# Patient Record
Sex: FEMALE | Race: WHITE
[De-identification: names, ages, dates, MRNs, and addresses within clinical notes are randomized per-mention and may not be internally consistent; named-entity substitution may affect disease eponyms.]

---

## 2020-11-11 ENCOUNTER — HOSPITAL ENCOUNTER (EMERGENCY)
Dept: HOSPITAL 88 - FSED | Age: 80
LOS: 1 days | Discharge: OTHER INSTITUTION | End: 2020-11-12
Payer: MEDICARE

## 2020-11-11 VITALS — BODY MASS INDEX: 26.58 KG/M2 | HEIGHT: 63 IN | WEIGHT: 150 LBS

## 2020-11-11 DIAGNOSIS — E86.0: ICD-10-CM
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
## 2020-11-11 DIAGNOSIS — E86.1: ICD-10-CM

## 2020-11-11 DIAGNOSIS — S00.83XA: Primary | ICD-10-CM

## 2020-11-11 DIAGNOSIS — R50.9: ICD-10-CM

## 2020-11-11 DIAGNOSIS — I10: ICD-10-CM

## 2020-11-11 PROCEDURE — 70486 CT MAXILLOFACIAL W/O DYE: CPT

## 2020-11-11 PROCEDURE — 72125 CT NECK SPINE W/O DYE: CPT

## 2020-11-11 PROCEDURE — 71250 CT THORAX DX C-: CPT

## 2020-11-11 PROCEDURE — 82553 CREATINE MB FRACTION: CPT

## 2020-11-11 PROCEDURE — 81003 URINALYSIS AUTO W/O SCOPE: CPT

## 2020-11-11 PROCEDURE — 80048 BASIC METABOLIC PNL TOTAL CA: CPT

## 2020-11-11 PROCEDURE — 84484 ASSAY OF TROPONIN QUANT: CPT

## 2020-11-11 PROCEDURE — 87040 BLOOD CULTURE FOR BACTERIA: CPT

## 2020-11-11 PROCEDURE — 74176 CT ABD & PELVIS W/O CONTRAST: CPT

## 2020-11-11 PROCEDURE — 87086 URINE CULTURE/COLONY COUNT: CPT

## 2020-11-11 PROCEDURE — 70450 CT HEAD/BRAIN W/O DYE: CPT

## 2020-11-11 PROCEDURE — 99284 EMERGENCY DEPT VISIT MOD MDM: CPT

## 2020-11-11 PROCEDURE — 85025 COMPLETE CBC W/AUTO DIFF WBC: CPT

## 2020-11-11 PROCEDURE — 93005 ELECTROCARDIOGRAM TRACING: CPT

## 2020-11-11 PROCEDURE — 80076 HEPATIC FUNCTION PANEL: CPT

## 2020-11-11 PROCEDURE — 87400 INFLUENZA A/B EACH AG IA: CPT

## 2020-11-11 NOTE — DIAGNOSTIC IMAGING REPORT
CT C-SPINE W/O - [facility]



HISTORY: Fall



COMPARISON:  None.



TECHNIQUE: CT of the cervical spine without contrast.  Sagittal and coronal

reformations were created.  One or more of the following dose reduction

techniques were used: Automated exposure control, adjustment of the mA and/or

kV according to patient size, and/or utilization of iterative reconstruction

technique.



FINDINGS:  

   

Cervical lordosis is straightened.

There is no significant scoliosis

Mild bone demineralization limits evaluation. 

No definite acute fracture or compression deformity is seen.     

The craniocervical junction is intact. 

No gross spinal canal masses are seen. 

The paravertebral and paraspinal soft tissues are unremarkable. 



Degenerative changes: Mild multilevel spondylosis is present. Multilevel

bilateral facet arthrosis is most prominent on the left at C3-C4. Associated

multilevel spondylolisthesis is present, most prominent at C4-C5 (grade 1).



Incidental findings:

There is scarring in the lung apices. The thyroid gland is atrophic. Minimal

left carotid bulb calcified plaque is present.



IMPRESSION:  

No acute osseous abnormalities. Degenerative changes as described above.



Signed by: Dr. Gonzalez Watson M.D. on 11/11/2020 9:25 PM

## 2020-11-11 NOTE — XMS REPORT
Clinical Summary

                             Created on: 2020



Divya Lcuas

External Reference #: [id number]

: 1940

Sex: Female



Demographics





                          Address                   [address] [place]  [address]

 

                          Home Phone                [phone]

 

                          Preferred Language        Unknown

 

                          Marital Status            

 

                          Shinto Affiliation     Unknown

 

                          Race                      White

 

                          Ethnic Group              Non-





Author





                          Author                    [facility]

 

                          Organization              [facility]

 

                          Address                   Unknown

 

                          Phone                     Unavailable







Support





                Name            Relationship    Address         Phone

 

                Smitha HOGUE            Unknown         [phone]







Care Team Providers





                    Care Team Member Name Role                Phone

 

                    Linden Ramires MD PCP                 [phone]







Allergies





                                        Comments



                 Active Allergy  Reactions       Severity        Noted Date 

 

                                        



hallucination



                           Morphine                  2019 







Medications





                          End Date                  Status



              Medication   Sig          Dispensed    Refills      Start  



                                         Date  

 

                                                    Active



                     losartan (COZAAR) 50 MG  Take 50 mg by       0   



                           tablet                    mouth daily.     

 

                                                    Active



                     levothyroxine sodium  Take by             0   



                           (LEVOTHYROXINE ORAL)      mouth.     

 

                                                    Active



                     HYDROcodone-acetaminophen  Take 1 tablet       0   



                           (NORCO)  mg per     by mouth     



                           tablet                    every 6 (six)     



                                         hours as     



                                         needed for     



                                         moderate     



                                         pain.     







Active Problems





 



                           Problem                   Noted Date

 

 



                           Arthritis of left knee    2019

 

 



                           Arthritis of knee, left   2019







Surgical History





   



                 Surgery         Date            Site/Laterality  Comments

 

   



                           BACK SURGERY              lumbar

 

   



                           CATARACT EXTRACTION       Bilateral 



                                         EXTRACAPSULAR W/   



                                         INTRAOCULAR LENS   



                                         IMPLANTATION   

 

   



                 ARTHROPLASTY, KNEE, TOTAL  2019       Knee/Left       Proc

edure: ARTHROPLASTY, KNEE,

TOTAL;  Surgeon:



                                         Michael Wu MD;  Location: [place] [address] OR;



                                         Service: Orthopedics;  Laterality: Left

;



                                         Medical devices from this surgery are i

n the



                                         Implants section.







Medical History





  



                     Medical History     Date                Comments

 

  



                           Anesthesia                per patient "difficult airw

ay" (no documentation)



                                         provided  / hx of - difficulty swallowi

ng, hx -



                                         hiatal hernia   -   [facility]

 

  



                           Does not exercise         denied SOB or CP    /  unab

le to climb stairs

 

  



                                         Hypothyroidism  

 

  



                                         Hypertension  

 

  



                           Arthritis                 osteoarthritis

 

  



                           Hx of foot surgery        left

 

  



                                         Cataracts, bilateral  







Social History





                                        Date



                 Tobacco Use     Types           Packs/Day       Years Used 

 

                                         



                                         Never Smoker    

 

    



                                         Smokeless Tobacco: Never   



                                         Used   







                    Drinks/Week         oz/Week             Comments



                                         Alcohol Use   

 

                                                             



                                         No   







  



                     Alcohol Habits      Answer              Date Recorded

 

  



                     How often do you have a drink containing alcohol?  Never   

            2019

 

  



                           How many drinks containing alcohol do you have on  No

t asked 



                                         a typical day when you are drinking?  

 

  



                           How often do you have six or more drinks on one  Not 

asked 



                                         occasion?  







 



                           Sex Assigned at Birth     Date Recorded

 

 



                                         Not on file 







Last Filed Vital Signs

Not on file



Plan of Treatment





   



                 Health Maintenance  Due Date        Last Done       Comments

 

   



                           SHINGLES VACCINES (#1)    10/28/1989  

 

   



                           65+ PNEUMOCOCCAL VACCINE  10/28/2004  



                                         (1 of 1 - PPSV23)   

 

   



                           INFLUENZA VACCINE         2020  







Implants





                    Device Identifier   Shelf Expiration Date Model / Serial / L

ot



                 Implanted       Type            Area            Manufactur   



                                         er   

 

                                        2023          [id number] /

 /

                                        [id number]



                 Attune Medial Dome Pat 38mm -  IPM             Left: Knee      

DEPUY   



                     [id number]          IMPLANT             ORTHOPAEDI   



                     Implanted: Qty: 1 on 2019 by  [employer]             HEIDI HERR Robin N., MD at [facility]      

 

                                        2028          [id number] 10 [id number] /

 /

                                        [id number]



                 Femoral Attune Ps Cmt 6 Lt -  IPM             Left: Knee      D

EPUY   



                     [id number]          IMPLANT             ORTHOPAEDI   



                     Implanted: Qty: 1 on 2019 by  [employer]             HEIDI HERR Robin N., MD at [facility]      

 

                                        2027          [id number] /

 /

                                        [id number]



                 Sz 6 Attune Fixed Bearing Tibial  IPM             Left: Knee   

   DEPUY   



                     Plate - [id number]  IMPLANT             ORTHOPAEDI   



                     Implanted: Qty: 1 on 2019 by  [employer]             HEIDI HERR Robin N., MD at [facility]      

 

                                        2023          [id number] 40 [id number] /

 /

[id number]



                 Insert Attune Ps Fb Sz6 6mm -  IPM             Left: Knee      

DEPUY   



                     [id number]          IMPLANT             ORTHOPAEDI   



                     Implanted: Qty: 1 on 2019 by  [employer]             HEIDI HERR Robin N., MD at [facility]      

 

                                        2020          [id number] /

 /

                                        [id number]



                 Cement Bone Hiviscocty 40gr  Surgical        Left: N/A       DE

PUY   



                     Smartset - [id number]  Bone                ORTHO   



                           Implanted: Qty: 1 on 2019 by  Michael Thompson MD at [facility]      

 

                                        2020          [id number] /

 /

                                        [id number]



                 Cement Bone Hiviscocty 40gr  Surgical        Left: Knee      DE

PUY   



                     Smartset - [id number]  Bone                ORTHO   



                           Implanted: Qty: 1 on 2019 by  Michael Thompson MD at [facility]      







Results

Not on fileafter 2019



Insurance





                                        Type



            Payer      Benefit    Subscriber ID  Effective  Phone      Address 



                           Plan /                    Dates   



                                         Group     

 

                                        HMO



                 AETNA MEDICARE  AETNA           [id number]        2019-P   



                           MEDICARE                  resent   



                                         HMO/PPO     



                                         [facility]     







     



            Guarantor Name  Account    Relation to  Date of    Phone      Billin

g Address



                     Type                Patient             Birth  

 

     



            Divya Lucas  Personal/F  Self       1940  [phone]  [address]               (Home)              [place] [address]







Advance Directives





For more information, please contact: [phone]







                          Patient Representative    Explanation



                           Type                      Date Recorded  

 

                                                     



                                         Advance Directives,   



                                         Living Will and   



                                         Medical Power of

## 2020-11-11 NOTE — DIAGNOSTIC IMAGING REPORT
CT BRAIN [facility]



HISTORY: Fall



COMPARISON:  None.



Technique: 

Noncontrast axial scans were obtained from skull base to the vertex.  Coronal

and sagittal reconstructions obtained from the axial data.  One or more of the

following dose reduction techniques were used: Automated exposure control,

adjustment of the mA and/or kV according to patient size, and/or utilization of

iterative reconstruction technique.



DISCUSSION:



Scalp/Skull: Unremarkable.

Brain sulci: Mildly prominent.

Ventricles: Mild to moderate supratentorial ventriculomegaly is slightly out of

proportion to sulcal prominence. The temporal horns are dilated.

Extra-axial spaces: No masses or fluid collections. Carotid siphon

calcifications are present.



Parenchyma: 

Mild to moderate bilateral deep white matter hypodensity is likely chronic

microvascular ischemic change. 

Otherwise, no masses, hemorrhage, or large vascular territory acute infarct.



Dural sinuses:  No abnormal densities.

Sellar/Suprasellar region: Intact.

Skull base: Intact.

Incidental findings: None.



IMPRESSION:

1.  Mild to moderate supratentorial ventriculomegaly is slightly out of

proportion to sulcal prominence. Correlate for communicating hydrocephalus

(i.e. normal pressure hydrocephalus in the appropriate clinical setting).

2.  Otherwise, no acute intracranial abnormalities.

3.  Mild to moderate supratentorial chronic microvascular ischemic change. Mild

generalized cerebral volume loss.





Signed by: Dr. Gonzalez Watson M.D. on 11/11/2020 9:20 PM

## 2020-11-11 NOTE — DIAGNOSTIC IMAGING REPORT
CT MAX/FACPARANASA [facility]



HISTORY: Fall



COMPARISON:  Concurrent head and cervical spine CT



TECHNIQUE:

Axial CT images through the face were obtained without contrast.

Coronal/sagittal reformations were created.  One or more of the following dose

reduction techniques were used: Automated exposure control, adjustment of the

mA and/or kV according to patient size, and/or utilization of iterative

reconstruction technique.



DISCUSSION:

   



No acute fracture is seen. No destructive osseous lesions are seen.



The orbits are intact. Bilateral ocular lens replacement. Intraorbital contents

are otherwise grossly unremarkable.



The paranasal sinuses are clear. The frontal and sphenoid sinuses are

hyperpneumatized.



Both petrous apices are partially pneumatized.



IMPRESSION:

No acute osseous abnormalities.



Signed by: Dr. Gonzalez Watson M.D. on 11/11/2020 9:28 PM

## 2020-11-11 NOTE — XMS REPORT
Continuity of Care Document

                             Created on: 2020



PATY LEVINE

External Reference #: [id number]

: 1940

Sex: Female



Demographics





                          Address                   [address] [place]  [address]

 

                          Home Phone                [phone]

 

                          Preferred Language        English

 

                          Marital Status            Unknown

 

                          Restorationist Affiliation     Unknown

 

                          Race                      Unknown

 

                          Additional Race(s)        White



 

                          Ethnic Group              Non-





Author





                          Author                    [facility]

t

 

                          Organization              [facility]

 

                          Address                   [address]

[place]  [address]



 

                          Phone                     Unavailable







Support





                Name            Relationship    Address         Phone

 

                    SAL RYAN    ECON                [address], [place]  [id number] [phone]

 

                    SAL RYAN    PRS                 [address] [place]  [address] [phone]







Care Team Providers





                    Care Team Member Name Role                Phone

 

                    Keyla ALVAREZ, Sarika Cheatham PCP                 [phone]

 

                    Kena ALVAREZ, Parish Larose Attphys             [phone]







Payers





           Payer Name Policy Type Policy Number Effective Date Expiration Date S

ource







Problems





           Condition Name Condition Details Condition Category Status     Onset 

Date Resolution

Date            Last Treatment Date Treating Clinician Comments        Source

 

             Arthritis of left knee Arthritis of left knee Disease      Active  

     2019 00:00:00

                                                                 [facility] Method[facility]

st

 

             Arthritis of knee, left Arthritis of knee, left Disease      Active

       2019 

00:00:00                                                         Nava Methodi

st







Allergies, Adverse Reactions, Alerts





        Allergy Name Allergy Type Status  Severity Reaction(s) Onset Date Inacti

ve Date 

Treating Clinician        Comments                  Source

 

          Morphine  Propensity to adverse reactions to drug Active              

          2019 00:00:00 

                                        hallucination       Nava Bahai

 

       morphine DA     Active MO            2019 00:00:00                 

     [facility]

 

       adhesive tape DA     Active MI            2019 00:00:00            

          [facility]

 

       morphine DA     Active U             2012 00:00:00                 

     [facility]

 

       adhesive tape DA     Active U             2012 00:00:00            

          [facility]







Social History





           Social Habit Start Date Stop Date  Quantity   Comments   Source

 

           History SDOH Alcohol Std Drinks                                      

       Elijah Jaffe

 

           History SDOH Alcohol Binge                                           

  Elijah Jaffe

 

           Sex Assigned At Birth                                             [facility]

 

           Tobacco use and exposure 2019 00:00:00 2019 00:00:00 Neve

r used            

Elijah Jaffe

 

                Alcohol intake  2019 00:00:00 2019 00:00:00 Current 

non-drinker of 

alcohol (finding)                                   Elijah Jaffe

 

           History SDOH Alcohol Frequency 2019 00:00:00 2019 00:00:0

0 1                     

Elijah Jaffe







                Smoking Status  Start Date      Stop Date       Source

 

                Never smoker                                    Elijah Ford

t







Medications





             Ordered Medication Name Filled Medication Name Start Date   Stop Da

te    Current 

Medication? Ordering Clinician Indication Dosage     Frequency  Signature (SIG) 

Comments                  Components                Source

 

       losartan (COZAAR) 50 MG tablet        2019 14:59:54        Yes     

             50mg   QD     Take 50 

mg by mouth daily.                                          Elijah Jaffe

 

       levothyroxine sodium (LEVOTHYROXINE ORAL)        2019 14:59:54     

   Yes                                

Take by mouth.                                              Elijah Jaffe

 

             HYDROcodone-acetaminophen (NORCO)  mg per tablet             

 2019 14:59:54              

Yes                                    1{tbl}       Q6H          Take 1 tablet b

y mouth every 6 (six) hours as needed for 

moderate pain.                                              Elijah Jaffe







Procedures

This patient has no known procedures.



Plan of Care





             Planned Activity Planned Date Details      Comments     Source

 

                    Future Scheduled Test 2020 00:00:00 INFLUENZA VACCINE 

[code = INFLUENZA 

VACCINE]                                            Elijah Jaffe

 

                    Future Scheduled Test 2004-10-28 00:00:00 65+ PNEUMOCOCCAL V

ACCINE (1 of 1 - 

PPSV23) [code = 65+ PNEUMOCOCCAL VACCINE (1 of 1 - PPSV23)]                     

      Elijah Jaffe

 

                    Future Scheduled Test 1989-10-28 00:00:00 SHINGLES VACCINES 

(#1) [code = 

SHINGLES VACCINES (#1)]                             Elijah Jaffe







Results

This patient has no known results.

## 2020-11-11 NOTE — EMERGENCY DEPARTMENT NOTE
History of Present Illnes


History of Present Illness


Chief Complaint:  COVID PUI


History of Present Illness


This is a 80 year old  female presents s/p fall at store. Patient fell 

forwards and complains of chest pain where she struck the floor. Denies pain 

anywhere else. Daughter witness fall and states patient fell forward and hit 

right side of face. No LOC. Did not trip. Patient was walking fast when fell. 

Patient also with fever, cough, sore throat X 3 days. Has chronic runny nose - 

no change. No headache, neck pain/stiffness, ear ache, abd pain, dysuria, 

hematuria, flank/back pain, loss of smell/taste, rash, or sick contacts. 

Daughter also concerned that over the last month the patient has had "starring 

spells" where was unresponsive to verbal stimuli for as long as twenty minutes. 

These have occurred several times a week and have gradually started happening 

more frequently. One episode of loss of bladder function during episode while on

bed. No seizure activity.


Historian:  Patient, Family Member


Arrival Mode:  Car


 Required:  No


Onset (how long ago):  minute(s)


Location:  Chest


Quality:  unable to specify


Onset quality:  sudden


Timing of current episode:  unable to specify


Progression:  unchanged


Chronicity:  new


Relieving factors:  none


Exacerbating factors:  none





Past Medical/Family History


Physician Review


I have reviewed the patient's past medical and family history.  Any updates have

been documented here.





Past Medical History


Recent Fever:  Yes


Clinical Suspicion of Infectio:  Yes


New/Unexplained Change in Ment:  Yes (Periods of unresponsivenss lasting up to 

20 min - see HPI)


Past Medical History:  Hypertension


Other Medical History:  


[facility]'s


Past Surgical History:  Hysterectomy, Back Surgery





Social History


Smoking Cessation:  Never Smoker


Any Illegal Drug Use:  No





Review of Systems


Review of Systems


Constitutional:  Reports chills, Reports fever; 


   Denies diaphoresis


EENTM:  Reports nose congestion, Reports throat pain; 


   Denies ear pain, Denies ear discharge, Denies nose pain, Denies throat 

swelling


Cardiovascular:  Reports chest pain; 


   Denies palpitations, Denies syncope


Respiratory:  Reports cough; 


   Denies hemoptysis, Denies pain with cough, Denies dyspnea, Denies dyspnea on 

exertion, Denies stridor, Denies wheezing


Gastrointestinal:  Denies abdominal pain, Denies diarrhea, Denies nausea, Denies

vomiting


Genitourinary:  Denies dysuria


Musculoskeletal:  Denies back pain, Denies joint pain, Denies muscle stiffness, 

Denies neck pain


Integumentary:  Denies rash


Neurological:  Reports weakness; 


   Denies headache, Denies seizure


Endocrine:  Denies increased urination


Review of other systems:  All other systems negative





Physical Exam


Related Data


Allergies:  


Coded Allergies:  


     morphine (Verified  Allergy, Unknown, 11/11/20)





Physical Exam


CONSTITUTIONAL





Constitutional:  Present well-developed, Present well-nourished


HENT


HENT:  Present normocephalic, Present atraumatic, Present mucosae dry, Present 

erythema; 


   Absent nose normal, Absent nasal discharge, Absent nasal congestion, Absent 

oropharyngeal exudate


HENT L/R:  Present left TM normal, Present right TM normal, Present left canal 

normal, Present right canal normal, Present left ext ear normal, Present right 

ext ear normal


EYES





Eyes:  Reports PERRL, Reports conjunctivae normal


NECK


Neck:  Present ROM normal, Present supple; 


   Absent cervical adenopathy


PULMONARY


Pulmonary:  Present effort normal, Present breath sounds normal, Present other 

(Completes full sentences)


CARDIOVASCULAR





Cardiovascular:  Present regular rhythm, Present heart sounds normal, Present 

capillary refill normal, Present normal rate


GASTROINTESTINAL





Abdominal:  Present soft, Present nontender, Present bowel sounds normal


GENITOURINARY





SKIN


Skin:  Present warm, Present dry


MUSCULOSKELETAL





Musculoskeletal:  Present ROM normal


NEUROLOGICAL





Neurological:  Present alert, Present oriented x 3, Present no gross motor or 

sensory deficits


PSYCHOLOGICAL


Psychological:  Present mood/affect normal





Results


Laboratory


Laboratory comments


UA: negative Isela/Nit, Glu 100, Kareem small, ketone trace, SG >1.030, Blood mod, 

Pro 100. WBC 15.2, HGB 11.9, HCT 37.1, , Glu 125, BUN 14, Cr 0.9, Na 141,

K 3.8, Cl 98, CO@ 27, Mg 1.8, LFT's WNL, Flu neg, Troponin neg





Imaging


Imaging Comments


EXAM: CT Chest, Abdomen and Pelvis WITHOUT contrast  


INDICATION:      Trauma, abdominal pain, fall, cough, fever 


COMPARISON: None.


TECHNIQUE: Chest, abdomen and pelvis were scanned utilizing a multidetector


helical scanner from the lung apex to the pubic symphysis without


administration of IV contrast. Absence of intravenous contrast decreases


sensitivity for detection of focal lesions and vascular pathology. Coronal and


sagittal reformations were obtained. Routine protocol was performed. 


     IV CONTRAST: None


     ORAL CONTRAST: None


            


COMPLICATIONS: None





RADIATION DOSE:


     Total DLP: 1647 mGy*cm


     Estimated effective dose: (DLP x 0.015 x size factor) mSv


     CTDIvol has been reviewed. It is below the limits set by the Radiation


Protocol Committee (RPC).


     Dose modulation, iterative reconstruction, and/or weight based adjustment


of the mA/kV was utilized to reduce the radiation dose to as low as reasonably


achievable. 





FINDINGS:





LINES and TUBES: None.





LUNGS AND AIRWAYS: Scattered bilateral patchy ground glass and consolidative


opacities. Right upper lung calcified granuloma.      Airways are normal.





PLEURA: The pleural spaces are clear.





HEART AND MEDIASTINUM: The thyroid gland is normal.  No mediastinal, hilar or


axillary lymphadenopathy.  The heart is normal in size. There is no pericardial


effusion.      Calcifications of the aorta and major branches including the


coronary arteries.





HEPATOBILIARY:      No focal hepatic lesions. No biliary ductal dilation. 





GALLBLADDER: No radio-opaque stones or sludge.  No wall thickening.





SPLEEN: No splenomegaly. Calcified splenic granulomata. 





PANCREAS: No focal masses or ductal dilatation.  





ADRENALS: No adrenal nodules    





KIDNEYS/URETERS: No hydronephrosis. No cystic or solid mass lesions.  No


stones.





GI TRACT: No abnormal distention, wall thickening, or evidence of bowel


obstruction.  Colonic diverticuli.  Appendix is not seen, no evidence of


appendicitis.





PELVIC ORGANS/BLADDER:     Hysterectomy. No adnexal masses. Urinary bladder


unremarkable.





LYMPH NODES: No lymphadenopathy.





VESSELS: Arterial calcifications.    





PERITONEUM / RETROPERITONEUM: No free air or fluid.





BONES:     Degenerative changes.





SOFT TISSUES: Unremarkable.            





IMPRESSION: 





1.  Findings in the lungs suggestive of multifocal pneumonia, possibly viral.





2.  Coronary artery calcific atherosclerosis.





3.  Colonic diverticulosis without diverticulitis.





4.  No acute traumatic abnormality.CT BRAIN WO-HOPD





HISTORY: Fall





COMPARISON:  None.





Technique: 


Noncontrast axial scans were obtained from skull base to the vertex.  Coronal


and sagittal reconstructions obtained from the axial data.  One or more of the


following dose reduction techniques were used: Automated exposure control,


adjustment of the mA and/or kV according to patient size, and/or utilization of


iterative reconstruction technique.





DISCUSSION:





Scalp/Skull: Unremarkable.


Brain sulci: Mildly prominent.


Ventricles: Mild to moderate supratentorial ventriculomegaly is slightly out of


proportion to sulcal prominence. The temporal horns are dilated.


Extra-axial spaces: No masses or fluid collections. Carotid siphon


calcifications are present.





Parenchyma: 


Mild to moderate bilateral deep white matter hypodensity is likely chronic


microvascular ischemic change. 


Otherwise, no masses, hemorrhage, or large vascular territory acute infarct.





Dural sinuses:  No abnormal densities.


Sellar/Suprasellar region: Intact.


Skull base: Intact.


Incidental findings: None.





IMPRESSION:


1.  Mild to moderate supratentorial ventriculomegaly is slightly out of


proportion to sulcal prominence. Correlate for communicating hydrocephalus


(i.e. normal pressure hydrocephalus in the appropriate clinical setting).


2.  Otherwise, no acute intracranial abnormalities.


3.  Mild to moderate supratentorial chronic microvascular ischemic change. Mild


generalized cerebral volume loss.





TECHNIQUE: CT of the cervical spine without contrast.  Sagittal and coronal


reformations were created.  One or more of the following dose reduction


techniques were used: Automated exposure control, adjustment of the mA and/or


kV according to patient size, and/or utilization of iterative reconstruction


technique.





FINDINGS:  


   


Cervical lordosis is straightened.


There is no significant scoliosis


Mild bone demineralization limits evaluation. 


No definite acute fracture or compression deformity is seen.     


The craniocervical junction is intact. 


No gross spinal canal masses are seen. 


The paravertebral and paraspinal soft tissues are unremarkable. 





Degenerative changes: Mild multilevel spondylosis is present. Multilevel


bilateral facet arthrosis is most prominent on the left at C3-C4. Associated


multilevel spondylolisthesis is present, most prominent at C4-C5 (grade 1).





Incidental findings:


There is scarring in the lung apices. The thyroid gland is atrophic. Minimal


left carotid bulb calcified plaque is present.





IMPRESSION:  


No acute osseous abnormalities. Degenerative changes as described above.





Signed by: Dr. Gonzalez Watson M.D. on 11/11/2020 9:25 PM





CT [facility]





HISTORY: Fall





COMPARISON:  Concurrent head and cervical spine CT





TECHNIQUE:


Axial CT images through the face were obtained without contrast.


Coronal/sagittal reformations were created.  One or more of the following dose


reduction techniques were used: Automated exposure control, adjustment of the


mA and/or kV according to patient size, and/or utilization of iterative


reconstruction technique.





DISCUSSION:


   





No acute fracture is seen. No destructive osseous lesions are seen.





The orbits are intact. Bilateral ocular lens replacement. Intraorbital contents


are otherwise grossly unremarkable.





The paranasal sinuses are clear. The frontal and sphenoid sinuses are


hyperpneumatized.





Both petrous apices are partially pneumatized.





IMPRESSION:


No acute osseous abnormalities.





Signed by: Dr. Gonzalez Watson M.D. on 11/11/2020 9:28 PM


EXAM: CT Chest, Abdomen and Pelvis WITHOUT contrast  


INDICATION:      Trauma, abdominal pain, fall, cough, fever 


COMPARISON: None.


TECHNIQUE: Chest, abdomen and pelvis were scanned utilizing a multidetector


helical scanner from the lung apex to the pubic symphysis without


administration of IV contrast. Absence of intravenous contrast decreases


sensitivity for detection of focal lesions and vascular pathology. Coronal and


sagittal reformations were obtained. Routine protocol was performed. 


     IV CONTRAST: None


     ORAL CONTRAST: None


            


COMPLICATIONS: None





RADIATION DOSE:


     Total DLP: 1647 mGy*cm


     Estimated effective dose: (DLP x 0.015 x size factor) mSv


     CTDIvol has been reviewed. It is below the limits set by the Radiation


Protocol Committee (RPC).


     Dose modulation, iterative reconstruction, and/or weight based adjustment


of the mA/kV was utilized to reduce the radiation dose to as low as reasonably


achievable. 





FINDINGS:





LINES and TUBES: None.





LUNGS AND AIRWAYS: Scattered bilateral patchy ground glass and consolidative


opacities. Right upper lung calcified granuloma.      Airways are normal.





PLEURA: The pleural spaces are clear.





HEART AND MEDIASTINUM: The thyroid gland is normal.  No mediastinal, hilar or


axillary lymphadenopathy.  The heart is normal in size. There is no pericardial


effusion.      Calcifications of the aorta and major branches including the


coronary arteries.





HEPATOBILIARY:      No focal hepatic lesions. No biliary ductal dilation. 





GALLBLADDER: No radio-opaque stones or sludge.  No wall thickening.





SPLEEN: No splenomegaly. Calcified splenic granulomata. 





PANCREAS: No focal masses or ductal dilatation.  





ADRENALS: No adrenal nodules    





KIDNEYS/URETERS: No hydronephrosis. No cystic or solid mass lesions.  No


stones.





GI TRACT: No abnormal distention, wall thickening, or evidence of bowel


obstruction.  Colonic diverticuli.  Appendix is not seen, no evidence of


appendicitis.





PELVIC ORGANS/BLADDER:     Hysterectomy. No adnexal masses. Urinary bladder


unremarkable.





LYMPH NODES: No lymphadenopathy.





VESSELS: Arterial calcifications.    





PERITONEUM / RETROPERITONEUM: No free air or fluid.





BONES:     Degenerative changes.





SOFT TISSUES: Unremarkable.            





IMPRESSION: 





1.  Findings in the lungs suggestive of multifocal pneumonia, possibly viral.





2.  Coronary artery calcific atherosclerosis.





3.  Colonic diverticulosis without diverticulitis.





4.  No acute traumatic abnormality.











Signed by: Jarrod Moore DO on 11/11/2020 10:00 PM





Procedures


12 Lead ECG Interpretation


ECG Interpretation :  


   ECG:  ECG 1


   :  Interpreted by ED physician


   Rhythm:  sinus rhythm


   Rate:  normal


   BPM:  92


   QRS axis:  normal


   ST segments normal:  Yes


   T waves normal:  Yes


   Clinical Impression:  normal ECG





Assessment & Plan


Medical Decision Making


MDM


Patient with multiple issues:


1. Fall- CT head, face and C-spine negative


2. Fever - cough & sore throat. WBC15, lactate 1.8. Sat 94% on RA. CT shows 

Scattered bilateral patchy ground glass and consolidative


opacities. Likely COVID


3. TIA symptoms Head CT negative. No episodes while in ED.


4. Volume depletion: Dry clinically and >SG on UA. Got IVF.





Reassessment


Reassessment


Spoke with hospitalist Dr. Khan who accepted patient.





Assessment & Plan


Final Impression:  


(1) Dehydration


(2) Hypovolemia


(3) Volume depletion


(4) Fever


Medications in the ED





Acetaminophen 975 mg NOW  PRN PO ELEVATED TEMPERATURE;  Start 11/11/20 at 20:00;

 Stop 12/11/20 at 19:59











TRU RUSSELL MD              Nov 11, 2020 20:42

## 2020-11-11 NOTE — DIAGNOSTIC IMAGING REPORT
EXAM: CT Chest, Abdomen and Pelvis WITHOUT contrast  

INDICATION:      Trauma, abdominal pain, fall, cough, fever 

COMPARISON: None.

TECHNIQUE: Chest, abdomen and pelvis were scanned utilizing a multidetector

helical scanner from the lung apex to the pubic symphysis without

administration of IV contrast. Absence of intravenous contrast decreases

sensitivity for detection of focal lesions and vascular pathology. Coronal and

sagittal reformations were obtained. Routine protocol was performed. 

     IV CONTRAST: None

     ORAL CONTRAST: None

            

COMPLICATIONS: None



RADIATION DOSE:

     Total DLP: 1647 mGy*cm

     Estimated effective dose: (DLP x 0.015 x size factor) mSv

     CTDIvol has been reviewed. It is below the limits set by the Radiation

Protocol Committee (RPC).

     Dose modulation, iterative reconstruction, and/or weight based adjustment

of the mA/kV was utilized to reduce the radiation dose to as low as reasonably

achievable. 



FINDINGS:



LINES and TUBES: None.



LUNGS AND AIRWAYS: Scattered bilateral patchy ground glass and consolidative

opacities. Right upper lung calcified granuloma.      Airways are normal.



PLEURA: The pleural spaces are clear.



HEART AND MEDIASTINUM: The thyroid gland is normal.  No mediastinal, hilar or

axillary lymphadenopathy.  The heart is normal in size. There is no pericardial

effusion.      Calcifications of the aorta and major branches including the

coronary arteries.



HEPATOBILIARY:      No focal hepatic lesions. No biliary ductal dilation. 



GALLBLADDER: No radio-opaque stones or sludge.  No wall thickening.



SPLEEN: No splenomegaly. Calcified splenic granulomata. 



PANCREAS: No focal masses or ductal dilatation.  



ADRENALS: No adrenal nodules    



KIDNEYS/URETERS: No hydronephrosis. No cystic or solid mass lesions.  No

stones.



GI TRACT: No abnormal distention, wall thickening, or evidence of bowel

obstruction.  Colonic diverticuli.  Appendix is not seen, no evidence of

appendicitis.



PELVIC ORGANS/BLADDER:     Hysterectomy. No adnexal masses. Urinary bladder

unremarkable.



LYMPH NODES: No lymphadenopathy.



VESSELS: Arterial calcifications.    



PERITONEUM / RETROPERITONEUM: No free air or fluid.



BONES:     Degenerative changes.



SOFT TISSUES: Unremarkable.            



IMPRESSION: 



1.  Findings in the lungs suggestive of multifocal pneumonia, possibly viral.



2.  Coronary artery calcific atherosclerosis.



3.  Colonic diverticulosis without diverticulitis.



4.  No acute traumatic abnormality.







Signed by: Jarrod Moore DO on 11/11/2020 10:00 PM

## 2020-11-11 NOTE — XMS REPORT
Clinical Summary

                             Created on: 2020



Lucas Divya [employer]

External Reference #: [id number]

: 1940

Sex: Female



Demographics





                          Address                   [address] [place] [address]

 

                          Home Phone                [phone]

 

                          Preferred Language        Unknown

 

                          Marital Status            Unknown

 

                          Yarsanism Affiliation     Unknown

 

                          Race                      Unknown

 

                          Ethnic Group              Unknown





Author





                          Author                    [facility]

 

                          Address                   Unknown

 

                          Phone                     Unavailable







Care Team Providers





                    Care Team Member Name Role                Phone

 

                          PCP                       Unavailable







Allergies

Not on File



Medications

Not on file



Active Problems





Not on file



Encounters





                          Care Team                 Description



                     Date                Type                Specialty  

 

                                        



Lachelle Escudero MD                 



                     2020          Refill              Family Medicine  



after 2019



Social History





                                        Date



                 Tobacco Use     Types           Packs/Day       Years Used 

 

                                         



                                         Never Assessed    







 



                           Sex Assigned at Birth     Date Recorded

 

 



                                         Not on file 







Last Filed Vital Signs

Not on file



Plan of Treatment





Not on file



Results

Not on fileafter 2019

## 2020-11-12 VITALS — SYSTOLIC BLOOD PRESSURE: 123 MMHG | DIASTOLIC BLOOD PRESSURE: 67 MMHG

## 2020-11-12 NOTE — NUR
PTS TOP DENTURES WERE PLACED IN BELONGINGS BAG WITH PTS CLOTHES AND SENT TO [facility] WITH PT.   PT STATED SHE WAS UNABLE TO GET BOTTOM DENTURES OUT 
OF MOUTH.

## 2024-09-05 ENCOUNTER — HOSPITAL ENCOUNTER (EMERGENCY)
Dept: HOSPITAL 88 - FSED | Age: 84
Discharge: HOME | End: 2024-09-05
Payer: MEDICARE

## 2024-09-05 VITALS — TEMPERATURE: 98.4 F | OXYGEN SATURATION: 99 % | HEART RATE: 64 BPM

## 2024-09-05 VITALS — HEIGHT: 63 IN | BODY MASS INDEX: 23.96 KG/M2 | WEIGHT: 135.25 LBS

## 2024-09-05 DIAGNOSIS — I10: ICD-10-CM

## 2024-09-05 DIAGNOSIS — G89.29: ICD-10-CM

## 2024-09-05 DIAGNOSIS — J06.9: ICD-10-CM

## 2024-09-05 DIAGNOSIS — E03.9: ICD-10-CM

## 2024-09-05 DIAGNOSIS — Z96.653: ICD-10-CM

## 2024-09-05 DIAGNOSIS — R05.9: Primary | ICD-10-CM

## 2024-09-05 DIAGNOSIS — M54.9: ICD-10-CM

## 2024-09-05 DIAGNOSIS — U07.1: ICD-10-CM

## 2024-09-05 PROCEDURE — 99283 EMERGENCY DEPT VISIT LOW MDM: CPT
